# Patient Record
Sex: MALE | Race: WHITE | NOT HISPANIC OR LATINO | URBAN - METROPOLITAN AREA
[De-identification: names, ages, dates, MRNs, and addresses within clinical notes are randomized per-mention and may not be internally consistent; named-entity substitution may affect disease eponyms.]

---

## 2022-11-10 ENCOUNTER — OFFICE VISIT (OUTPATIENT)
Dept: FAMILY MEDICINE CLINIC | Facility: CLINIC | Age: 37
End: 2022-11-10

## 2022-11-10 VITALS
HEIGHT: 67 IN | HEART RATE: 96 BPM | WEIGHT: 144.2 LBS | DIASTOLIC BLOOD PRESSURE: 78 MMHG | BODY MASS INDEX: 22.63 KG/M2 | TEMPERATURE: 97.1 F | SYSTOLIC BLOOD PRESSURE: 120 MMHG | OXYGEN SATURATION: 98 %

## 2022-11-10 DIAGNOSIS — J30.2 SEASONAL ALLERGIES: ICD-10-CM

## 2022-11-10 DIAGNOSIS — Z86.59 HISTORY OF ATTENTION DEFICIT DISORDER: ICD-10-CM

## 2022-11-10 DIAGNOSIS — Z00.00 WELL ADULT EXAM: Primary | ICD-10-CM

## 2022-11-10 DIAGNOSIS — K60.2 ANAL FISSURE: ICD-10-CM

## 2022-11-10 DIAGNOSIS — Z13.1 DIABETES MELLITUS SCREENING: ICD-10-CM

## 2022-11-10 DIAGNOSIS — L98.9 SKIN LESIONS: ICD-10-CM

## 2022-11-10 DIAGNOSIS — Z78.9 UNCIRCUMCISED MALE: ICD-10-CM

## 2022-11-10 DIAGNOSIS — Z13.6 ENCOUNTER FOR LIPID SCREENING FOR CARDIOVASCULAR DISEASE: ICD-10-CM

## 2022-11-10 DIAGNOSIS — Z13.220 ENCOUNTER FOR LIPID SCREENING FOR CARDIOVASCULAR DISEASE: ICD-10-CM

## 2022-11-10 RX ORDER — ALBUTEROL SULFATE 90 UG/1
2 AEROSOL, METERED RESPIRATORY (INHALATION) EVERY 6 HOURS PRN
Qty: 6.7 G | Refills: 5 | Status: SHIPPED | OUTPATIENT
Start: 2022-11-10

## 2022-11-10 NOTE — PROGRESS NOTES
ADULT ANNUAL [address] [place] [address] MEDICAL GROUP    NAME: Genevie Favre  AGE: 42 y o  SEX: male  : 1985     DATE: 2022     Assessment and Plan:     Problem List Items Addressed This Visit    None  Visit Diagnoses     Well adult exam    -  Primary    Relevant Orders    Lipid Panel with Direct LDL reflex    Comprehensive metabolic panel    TSH, 3rd generation with Free T4 reflex    CBC and differential    Seasonal allergies        Relevant Medications    albuterol (Proventil HFA) 90 mcg/act inhaler    Encounter for lipid screening for cardiovascular disease        Relevant Orders    Lipid Panel with Direct LDL reflex    Diabetes mellitus screening        Relevant Orders    Comprehensive metabolic panel    Anal fissure        Relevant Orders    Ambulatory Referral to Colorectal Surgery    Skin lesions        Relevant Orders    Ambulatory Referral to Dermatology    History of attention deficit disorder        Relevant Orders    TSH, 3rd generation with Free T4 reflex    CBC and differential    Uncircumcised male        Relevant Orders    Ambulatory Referral to Urology          Immunizations and preventive care screenings were discussed with patient today  Appropriate education was printed on patient's after visit summary  Counseling:  Alcohol/drug use: discussed moderation in alcohol intake, the recommendations for healthy alcohol use, and avoidance of illicit drug use  Dental Health: discussed importance of regular tooth brushing, flossing, and dental visits  Exercise: the importance of regular exercise/physical activity was discussed  Recommend exercise 3-5 times per week for at least 30 minutes  Depression Screening and Follow-up Plan: Patient was screened for depression during today's encounter  They screened negative with a PHQ-2 score of 1  Referred to derm for multiple skin lesions  Referred to urology regarding circumcision    Referral to surgery regarding anal fissure  No follow-ups on file  Chief Complaint:     Chief Complaint   Patient presents with   • Establish Care     Refill on rescue inhaler  History of Present Illness:     Adult Annual Physical   Patient here for a comprehensive physical exam  The patient reports problems - problems with focus at new job   Works as a manager at [employer]  He had been on Ritalin as a kid  Previous doctor had prescribed Escitalopram and Effexor  Diet and Physical Activity  Diet/Nutrition: well balanced diet  Exercise: 5-7 times a week on average  Depression Screening  PHQ-2/9 Depression Screening    Little interest or pleasure in doing things: 0 - not at all  Feeling down, depressed, or hopeless: 1 - several days  PHQ-2 Score: 1  PHQ-2 Interpretation: Negative depression screen       General Health  Sleep: sleeps poorly  Hearing: normal - bilateral   Vision: wears glasses  Dental: regular dental visits   Health  History of STDs?: no      Review of Systems:     Review of Systems   Constitutional: Negative for chills and fever  HENT: Negative for congestion and sore throat  Respiratory: Negative for chest tightness  Cardiovascular: Negative for chest pain and palpitations  Gastrointestinal: Negative for abdominal pain, constipation, diarrhea and nausea  Genitourinary: Negative for difficulty urinating  Skin: Negative  Neurological: Negative for dizziness and headaches  Psychiatric/Behavioral: Negative  Past Medical History:     Past Medical History:   Diagnosis Date   • ADHD     Diagnosed at age 6   • Allergic       Past Surgical History:     History reviewed  No pertinent surgical history     Social History:     Social History     Socioeconomic History   • Marital status: Unknown     Spouse name: None   • Number of children: None   • Years of education: None   • Highest education level: None   Occupational History   • None   Tobacco Use   • Smoking status: Former     Packs/day: 1 00     Years: 5 00     Pack years: 5 00     Types: Cigarettes     Quit date: 2017     Years since quittin 0   • Smokeless tobacco: Never   Vaping Use   • Vaping Use: Every day   • Substances: Nicotine   Substance and Sexual Activity   • Alcohol use: Yes     Comment: social   • Drug use: Never   • Sexual activity: None   Other Topics Concern   • None   Social History Narrative   • None     Social Determinants of Health     Financial Resource Strain: Not on file   Food Insecurity: Not on file   Transportation Needs: Not on file   Physical Activity: Not on file   Stress: Not on file   Social Connections: Not on file   Intimate Partner Violence: Not on file   Housing Stability: Not on file      Family History:     Family History   Problem Relation Age of Onset   • Autoimmune disease Mother    • No Known Problems Sister       Current Medications:     Current Outpatient Medications   Medication Sig Dispense Refill   • albuterol (Proventil HFA) 90 mcg/act inhaler Inhale 2 puffs every 6 (six) hours as needed for wheezing 6 7 g 5     No current facility-administered medications for this visit  Allergies: Allergies   Allergen Reactions   • Pollen Extract Anaphylaxis     sneezing   • Amoxicillin Hives   • Shellfish-Derived Products - Food Allergy Hives      Physical Exam:     /78   Pulse 96   Temp (!) 97 1 °F (36 2 °C) (Tympanic)   Ht 5' 7" (1 702 m)   Wt 65 4 kg (144 lb 3 2 oz)   SpO2 98%   BMI 22 58 kg/m²     Physical Exam  Constitutional:       General: He is not in acute distress  Appearance: He is well-developed  HENT:      Right Ear: Tympanic membrane normal       Left Ear: Tympanic membrane normal       Mouth/Throat:      Pharynx: No oropharyngeal exudate  Neck:      Thyroid: No thyromegaly  Cardiovascular:      Rate and Rhythm: Normal rate and regular rhythm  Heart sounds: Normal heart sounds     Pulmonary:      Effort: Pulmonary effort is normal  Breath sounds: Normal breath sounds  Abdominal:      General: Bowel sounds are normal       Palpations: Abdomen is soft  Musculoskeletal:      Right lower leg: No edema  Left lower leg: No edema  Lymphadenopathy:      Cervical: No cervical adenopathy  Skin:     General: Skin is warm and dry  Neurological:      Mental Status: He is alert and oriented to person, place, and time     Psychiatric:         Mood and Affect: Mood normal           Nazia Snyder [facility] [address]

## 2022-11-10 NOTE — PATIENT INSTRUCTIONS
Evaluation for ADD   [facility] [phone]   [facility]  [phone]    [facility]   [phone]

## 2022-12-08 ENCOUNTER — OFFICE VISIT (OUTPATIENT)
Dept: UROLOGY | Facility: MEDICAL CENTER | Age: 37
End: 2022-12-08

## 2022-12-08 VITALS
HEIGHT: 67 IN | WEIGHT: 141 LBS | SYSTOLIC BLOOD PRESSURE: 140 MMHG | DIASTOLIC BLOOD PRESSURE: 74 MMHG | BODY MASS INDEX: 22.13 KG/M2 | HEART RATE: 101 BPM

## 2022-12-08 DIAGNOSIS — N47.1 PHIMOSIS: Primary | ICD-10-CM

## 2022-12-08 DIAGNOSIS — Z78.9 UNCIRCUMCISED MALE: ICD-10-CM

## 2022-12-08 NOTE — PROGRESS NOTES
HISTORY:    Patient has had he says 2-3 UTIs over the years, and feels his hygiene can be better with circumcision  Occasional discomfort with tightness of the foreskin, but no sexual issues  No voiding dysfunction          ASSESSMENT / PLAN:    We discussed circumcision, technique, potential discomfort afterwards and recovery  We will schedule    The following portions of the patient's history were reviewed and updated as appropriate: allergies, current medications, past family history, past medical history, past social history, past surgical history and problem list     Review of Systems   All other systems reviewed and are negative  Objective:     Physical Exam  Constitutional:       General: He is not in acute distress  Appearance: He is well-developed  He is not diaphoretic  HENT:      Head: Normocephalic and atraumatic  Eyes:      General: No scleral icterus  Pulmonary:      Effort: Pulmonary effort is normal    Genitourinary:     Comments: Penis with slightly tight foreskin    Testes normal  Skin:     Coloration: Skin is not pale  Neurological:      Mental Status: He is alert and oriented to person, place, and time  Psychiatric:         Behavior: Behavior normal          Thought Content: Thought content normal          Judgment: Judgment normal            No results found for: PSA]  No results found for: BUN  No results found for: CREATININE  No components found for: CBC      There is no problem list on file for this patient  Diagnoses and all orders for this visit:    Uncircumcised male  -     Ambulatory Referral to Urology           Patient ID: Humza Espinoza is a 42 y o  male        Current Outpatient Medications:   •  albuterol (Proventil HFA) 90 mcg/act inhaler, Inhale 2 puffs every 6 (six) hours as needed for wheezing, Disp: 6 7 g, Rfl: 5    Past Medical History:   Diagnosis Date   • ADHD     Diagnosed at age 6   • Allergic        No past surgical history on file     Social History

## 2022-12-15 ENCOUNTER — TELEPHONE (OUTPATIENT)
Dept: OTHER | Facility: OTHER | Age: 37
End: 2022-12-15

## 2022-12-15 NOTE — TELEPHONE ENCOUNTER
patient called back  pateint stated that date does not work for patient  Its 2 days before patients Birthday  Would like to have done in January if possible    Or alternative date after birthday  Please rigo patent back at [phone]

## 2022-12-15 NOTE — TELEPHONE ENCOUNTER
I I spoke to the patient and scheduled his Circ with Dr Ary Cockayne at [facility]      -instructions given verbally and Emailed  -CBC, CMP,Urine C&S   2 weeks prior  -patient will avoid any potentially blood thinning medications 7 days prior  -[facility] - on [address] [place] [address] tracker 12/15/2022

## 2022-12-15 NOTE — TELEPHONE ENCOUNTER
I returned the patients call and left a voice mail message suggesting 2/14/2023 at [facility] with Dr Connie Ross  I asked the patient to call back to confirm

## 2023-01-12 ENCOUNTER — CONSULT (OUTPATIENT)
Dept: DERMATOLOGY | Facility: CLINIC | Age: 38
End: 2023-01-12

## 2023-01-12 VITALS — TEMPERATURE: 97.7 F | HEIGHT: 68 IN | WEIGHT: 137 LBS | BODY MASS INDEX: 20.76 KG/M2

## 2023-01-12 DIAGNOSIS — Z12.83 SCREENING FOR MALIGNANT NEOPLASM OF SKIN: Primary | ICD-10-CM

## 2023-01-12 NOTE — PROGRESS NOTES
[facility]  Dermatology Clinic Note     Patient Name: Georgina Vargas  Encounter Date: 1/12/23     Have you been cared for by a [place] [address] Dermatologist in the last 3 years and, if so, which description applies to you? NO  I am considered a "new" patient and must complete all patient intake questions  I am MALE/not capable of bearing children  REVIEW OF SYSTEMS:  Have you recently had or currently have any of the following? · Recent fever or chills? No  · Any non-healing wound? No   PAST MEDICAL HISTORY:  Have you personally ever had or currently have any of the following? If "YES," then please provide more detail  · Skin cancer (such as Melanoma, Basal Cell Carcinoma, Squamous Cell Carcinoma? No  · Tuberculosis, HIV/AIDS, Hepatitis B or C: No  · Systemic Immunosuppression such as Diabetes, Biologic or Immunotherapy, Chemotherapy, Organ Transplantation, Bone Marrow Transplantation No  · Radiation Treatment No   FAMILY HISTORY:  Any "first degree relatives" (parent, brother, sister, or child) with the following? • Skin Cancer, Pancreatic or Other Cancer? No   PATIENT EXPERIENCE:    • Do you want the Dermatologist to perform a COMPLETE skin exam today including a clinical examination under the "bra and underwear" areas? Yes  • If necessary, do we have your permission to call and leave a detailed message on your Preferred Phone number that includes your specific medical information?   Yes      Allergies   Allergen Reactions   • Pollen Extract Anaphylaxis     sneezing   • Amoxicillin Hives   • Shellfish-Derived Products - Food Allergy Hives      Current Outpatient Medications:   •  albuterol (Proventil HFA) 90 mcg/act inhaler, Inhale 2 puffs every 6 (six) hours as needed for wheezing, Disp: 6 7 g, Rfl: 5          • Whom besides the patient is providing clinical information about today's encounter?   o NO ADDITIONAL HISTORIAN (patient alone provided history)    Physical Exam and Assessment/Plan by Diagnosis:      SCREENING SKIN EXAMINATION    Physical Exam:  • Anatomic Location Affected:   Mostly on sun-exposed areas of the   • Morphological Description:  Normal skin appearance  • Pertinent Positives:  • Pertinent Negatives: Additional History of Present Condition:     Assessment and Plan:  Based on a thorough discussion of this condition and the management approach to it (including a comprehensive discussion of the known risks, side effects and potential benefits of treatment), the patient (family) agrees to implement the following specific plan:  • Reassured benign   • Continue with yearly skin exams   • When outside we recommend using a wide brim hat, sunglasses, long sleeve and pants, sunscreen with SPF 80+ with reapplication every 2 hours, or SPF specific clothing  • Use a moisturizer + sunscreen "combo" product such as Neutrogena Daily Defense SPF 50+ or CeraVe AM at least three times a day  Melanocytic Nevi  Melanocytic nevi ("moles") are caused by collections of the color producing skin cells, or melanocytes, in 1 area in the skin  They can range in color from pink to dark brown and be either raised or flat  Some moles are present at birth (I e , "congenital nevi"), while others come up later in life (i e , "acquired nevi")  [place] exposure also stimulates the body to make more moles, ie the more sun you get the more moles you'll grow  Clinically distinguishing a healthy mole from melanoma may be difficult  The "ABCDE's" of moles have been suggested as a means of helping to alert a person to a suspicious mole and the possible increased risk of melanoma  Asymmetry: Healthy moles tend to be symmetric, while melanomas are often asymmetric    Asymmetry means if you draw a line through the mole, the two halves do not match in color, size, shape, or surface texture Any mole that starts to demonstrate "asymmetry" should be examined promptly by a board certified dermatologist      Aysha Hurtado: Healthy moles tend to have discrete, even borders  The border of a melanoma often blends into the normal skin and does not sharply delineate the mole from normal skin  Any mole that starts to demonstrate "uneven borders" should be examined promptly     Color: Healthy moles tend to be one color throughout  Melanomas tend to be made up of different colors ranging from dark black, blue, white, or red  Any mole that demonstrates a color change should be examined promptly    Diameter: Healthy moles tend to be smaller than 0 6 cm in size; an exception are "congenital nevi" that can be larger  Melanomas tend to grow and can often be greater than 0 6 cm (1/4 of an inch, or the size of a pencil eraser)  This is only a guideline, and many normal moles may be larger than 0 6 cm without being unhealthy  Any mole that starts to change in size (small to bigger or bigger to smaller) should be examined promptly    Evolving: Healthy moles tend to "stay the same "  Melanomas may often show signs of change or evolution such as a change in size, shape, color, or elevation  Any mole that starts to itch, bleed, crust, burn, hurt, or ulcerate or demonstrate a change or evolution should be examined promptly by a board certified dermatologist       What are atypical moles or dysplastic nevi? Dysplastic moles are moles that have some of the ABCDE  changes listed above but  are not cancerous  Sometimes a biopsy and microscopic examination are needed to determine the difference  They may indicate an increased risk of melanoma in that person, especially if there is a family history of melanoma  What is a Melanoma? The main concern when looking at a new or changing mole it to evaluate whether it may be a melanoma  The appearance of a "new mole" remains one of the most reliable methods for identifying a malignant melanoma  A melanoma is a type of skin cancer that can be deadly if it spreads throughout the body   The prognosis of a Melanoma depends on how deep it has penetrated in the skin  If caught early, they generally will not have had time to grow into the deeper layers of the skin and they cure rate is then very high  Once the melanoma grows deeper into the skin, the cure rate drops dramatically  Therefore, early detection and removal of a malignant melanoma results in a much better chance of complete cure            Scribe Attestation    I,:  Rozina Cade am acting as a scribe while in the presence of the attending physician :       I,:  Lucy Pepe MD personally performed the services described in this documentation    as scribed in my presence :

## 2023-01-12 NOTE — PATIENT INSTRUCTIONS
MELANOCYTIC NEVI ("Moles")      Assessment and Plan:  Based on a thorough discussion of this condition and the management approach to it (including a comprehensive discussion of the known risks, side effects and potential benefits of treatment), the patient (family) agrees to implement the following specific plan:  Reassured benign   Continue with yearly skin exams   When outside we recommend using a wide brim hat, sunglasses, long sleeve and pants, sunscreen with SPF 87+ with reapplication every 2 hours, or SPF specific clothing  Use a moisturizer + sunscreen "combo" product such as Neutrogena Daily Defense SPF 50+ or CeraVe AM at least three times a day  Melanocytic Nevi  Melanocytic nevi ("moles") are caused by collections of the color producing skin cells, or melanocytes, in 1 area in the skin  They can range in color from pink to dark brown and be either raised or flat  Some moles are present at birth (I e , "congenital nevi"), while others come up later in life (i e , "acquired nevi")  [place] exposure also stimulates the body to make more moles, ie the more sun you get the more moles you'll grow  Clinically distinguishing a healthy mole from melanoma may be difficult  The "ABCDE's" of moles have been suggested as a means of helping to alert a person to a suspicious mole and the possible increased risk of melanoma  Asymmetry: Healthy moles tend to be symmetric, while melanomas are often asymmetric  Asymmetry means if you draw a line through the mole, the two halves do not match in color, size, shape, or surface texture Any mole that starts to demonstrate "asymmetry" should be examined promptly by a board certified dermatologist      Border: Healthy moles tend to have discrete, even borders  The border of a melanoma often blends into the normal skin and does not sharply delineate the mole from normal skin    Any mole that starts to demonstrate "uneven borders" should be examined promptly Color: Healthy moles tend to be one color throughout  Melanomas tend to be made up of different colors ranging from dark black, blue, white, or red  Any mole that demonstrates a color change should be examined promptly    Diameter: Healthy moles tend to be smaller than 0 6 cm in size; an exception are "congenital nevi" that can be larger  Melanomas tend to grow and can often be greater than 0 6 cm (1/4 of an inch, or the size of a pencil eraser)  This is only a guideline, and many normal moles may be larger than 0 6 cm without being unhealthy  Any mole that starts to change in size (small to bigger or bigger to smaller) should be examined promptly    Evolving: Healthy moles tend to "stay the same "  Melanomas may often show signs of change or evolution such as a change in size, shape, color, or elevation  Any mole that starts to itch, bleed, crust, burn, hurt, or ulcerate or demonstrate a change or evolution should be examined promptly by a board certified dermatologist       What are atypical moles or dysplastic nevi? Dysplastic moles are moles that have some of the ABCDE  changes listed above but  are not cancerous  Sometimes a biopsy and microscopic examination are needed to determine the difference  They may indicate an increased risk of melanoma in that person, especially if there is a family history of melanoma  What is a Melanoma? The main concern when looking at a new or changing mole it to evaluate whether it may be a melanoma  The appearance of a "new mole" remains one of the most reliable methods for identifying a malignant melanoma  A melanoma is a type of skin cancer that can be deadly if it spreads throughout the body  The prognosis of a Melanoma depends on how deep it has penetrated in the skin  If caught early, they generally will not have had time to grow into the deeper layers of the skin and they cure rate is then very high   Once the melanoma grows deeper into the skin, the cure rate drops dramatically  Therefore, early detection and removal of a malignant melanoma results in a much better chance of complete cure

## 2023-01-30 ENCOUNTER — TELEPHONE (OUTPATIENT)
Dept: UROLOGY | Facility: MEDICAL CENTER | Age: 38
End: 2023-01-30

## 2023-02-09 ENCOUNTER — APPOINTMENT (OUTPATIENT)
Dept: LAB | Facility: CLINIC | Age: 38
End: 2023-02-09

## 2023-02-09 DIAGNOSIS — Z13.6 ENCOUNTER FOR LIPID SCREENING FOR CARDIOVASCULAR DISEASE: ICD-10-CM

## 2023-02-09 DIAGNOSIS — N47.1 PHIMOSIS: ICD-10-CM

## 2023-02-09 DIAGNOSIS — Z78.9 UNCIRCUMCISED MALE: ICD-10-CM

## 2023-02-09 DIAGNOSIS — Z00.00 WELL ADULT EXAM: ICD-10-CM

## 2023-02-09 DIAGNOSIS — Z13.220 ENCOUNTER FOR LIPID SCREENING FOR CARDIOVASCULAR DISEASE: ICD-10-CM

## 2023-02-09 DIAGNOSIS — Z13.1 DIABETES MELLITUS SCREENING: ICD-10-CM

## 2023-02-09 DIAGNOSIS — Z86.59 HISTORY OF ATTENTION DEFICIT DISORDER: ICD-10-CM

## 2023-02-09 LAB
ALBUMIN SERPL BCP-MCNC: 3.8 G/DL (ref 3.5–5)
ALP SERPL-CCNC: 66 U/L (ref 46–116)
ALT SERPL W P-5'-P-CCNC: 47 U/L (ref 12–78)
ANION GAP SERPL CALCULATED.3IONS-SCNC: 3 MMOL/L (ref 4–13)
AST SERPL W P-5'-P-CCNC: 22 U/L (ref 5–45)
BASOPHILS # BLD AUTO: 0.04 THOUSANDS/ÂΜL (ref 0–0.1)
BASOPHILS NFR BLD AUTO: 1 % (ref 0–1)
BILIRUB SERPL-MCNC: 0.39 MG/DL (ref 0.2–1)
BUN SERPL-MCNC: 21 MG/DL (ref 5–25)
CALCIUM SERPL-MCNC: 8.9 MG/DL (ref 8.3–10.1)
CHLORIDE SERPL-SCNC: 107 MMOL/L (ref 96–108)
CHOLEST SERPL-MCNC: 195 MG/DL
CO2 SERPL-SCNC: 27 MMOL/L (ref 21–32)
CREAT SERPL-MCNC: 1.03 MG/DL (ref 0.6–1.3)
EOSINOPHIL # BLD AUTO: 0.32 THOUSAND/ÂΜL (ref 0–0.61)
EOSINOPHIL NFR BLD AUTO: 4 % (ref 0–6)
ERYTHROCYTE [DISTWIDTH] IN BLOOD BY AUTOMATED COUNT: 13 % (ref 11.6–15.1)
GFR SERPL CREATININE-BSD FRML MDRD: 91 ML/MIN/1.73SQ M
GLUCOSE SERPL-MCNC: 89 MG/DL (ref 65–140)
HCT VFR BLD AUTO: 44.4 % (ref 36.5–49.3)
HDLC SERPL-MCNC: 54 MG/DL
HGB BLD-MCNC: 14.9 G/DL (ref 12–17)
IMM GRANULOCYTES # BLD AUTO: 0.03 THOUSAND/UL (ref 0–0.2)
IMM GRANULOCYTES NFR BLD AUTO: 0 % (ref 0–2)
LDLC SERPL CALC-MCNC: 119 MG/DL (ref 0–100)
LYMPHOCYTES # BLD AUTO: 3.09 THOUSANDS/ÂΜL (ref 0.6–4.47)
LYMPHOCYTES NFR BLD AUTO: 41 % (ref 14–44)
MCH RBC QN AUTO: 30 PG (ref 26.8–34.3)
MCHC RBC AUTO-ENTMCNC: 33.6 G/DL (ref 31.4–37.4)
MCV RBC AUTO: 90 FL (ref 82–98)
MONOCYTES # BLD AUTO: 0.67 THOUSAND/ÂΜL (ref 0.17–1.22)
MONOCYTES NFR BLD AUTO: 9 % (ref 4–12)
NEUTROPHILS # BLD AUTO: 3.41 THOUSANDS/ÂΜL (ref 1.85–7.62)
NEUTS SEG NFR BLD AUTO: 45 % (ref 43–75)
NRBC BLD AUTO-RTO: 0 /100 WBCS
PLATELET # BLD AUTO: 230 THOUSANDS/UL (ref 149–390)
PMV BLD AUTO: 10.4 FL (ref 8.9–12.7)
POTASSIUM SERPL-SCNC: 3.9 MMOL/L (ref 3.5–5.3)
PROT SERPL-MCNC: 6.9 G/DL (ref 6.4–8.4)
RBC # BLD AUTO: 4.96 MILLION/UL (ref 3.88–5.62)
SODIUM SERPL-SCNC: 137 MMOL/L (ref 135–147)
TRIGL SERPL-MCNC: 112 MG/DL
TSH SERPL DL<=0.05 MIU/L-ACNC: 4.07 UIU/ML (ref 0.45–4.5)
WBC # BLD AUTO: 7.56 THOUSAND/UL (ref 4.31–10.16)

## 2023-02-10 LAB — BACTERIA UR CULT: NORMAL

## 2023-02-16 RX ORDER — ASCORBIC ACID 1000 MG
TABLET ORAL
COMMUNITY

## 2023-02-16 RX ORDER — MELATONIN 10 MG
10 CAPSULE ORAL
COMMUNITY

## 2023-02-16 RX ORDER — DIPHENOXYLATE HYDROCHLORIDE AND ATROPINE SULFATE 2.5; .025 MG/1; MG/1
1 TABLET ORAL DAILY
COMMUNITY

## 2023-02-16 NOTE — PRE-PROCEDURE INSTRUCTIONS
Pre-Surgery Instructions:   Medication Instructions   • albuterol (Proventil HFA) 90 mcg/act inhaler Uses PRN- OK to take day of surgery   • Biotin w/ Vitamins C & E (HAIR/SKIN/NAILS PO) Stop taking 5 days prior to surgery  • Ginkgo Biloba 40 MG TABS Stop taking 5 days prior to surgery  • GINSENG [place] PO Stop taking 5 days prior to surgery  • Thanh Root (THANH PO) Stop taking 5 days prior to surgery  • Melatonin 10 MG CAPS Stop taking 5 days prior to surgery  • multivitamin (THERAGRAN) TABS Stop taking 5 days prior to surgery  • NON FORMULARY Stop taking 5 days prior to surgery  • Specialty Vitamins Products (ADVANCED COLLAGEN PO) Stop taking 5 days prior to surgery  NPO after midnight, meds in am with sips of water  Understands when to expect preop phone call  Remove all jewelry  Advised of visitation policy  Before your operation, you play an important role in decreasing your risk for infection by washing with special antiseptic soap  This is an effective way to reduce bacteria on the skin which may help to prevent infections at the surgical site  Please read the following directions in advance    Use antibacterial soap    2  The day before your operation follow these directions carefully to get ready  · Place clean lines (sheets) on your bed; you should sleep on clean sheets after your evening shower  · Get clean towels and washcloths ready - you need enough for 2 showers  · Set aside clean underwear, pajamas, and clothing to wear after the shower  Reminders:  · DO NOT use any other soap or body rinse on your skin during or after the antiseptic showers  · DO NOT use lotion , powder, deodorant, or perfume/aftershave of any kind on your skin after your antiseptic shower  · DO NOT shave any body parts in the 24 hours/the day before your operation  · DO NOT get the antiseptic soap in your eyes, ears, nose, mouth, or vaginal area      3      You will need to shower the night before AND the morning of your Surgery  Shower 1:  · The evening before your operation, take the fist shower  · First, shampoo your hair with regular shampoo and rinse it completely before you use the anitseptic soap  After washing and rinsing your hair, rinse your body  · Next, use a clean wash cloth to apply the antiseptic soap and wash your body from the neck down to your toes using 1/2 bottle of the antiseptic soap  You will use the other 1/2 bottle for the second shower  · Clean the area where your incision will be; later this area well for about 2 minutes  · If you ar having head or neck surgery, wash areas with the antiseptic soap  · Rinse yourself completely with running water  · Use a clean towel to dry off  · Wear clean underwear and clothing/pajamas  Shower 2:  · The Morning of your operation, take the second shower following the same steps as Shower 1 using the second 1/2 of the bottle of antiseptic soap  · Use clean cloths and towels to was and dry yourself off  · Wear clean underwear and clothing

## 2023-02-19 ENCOUNTER — ANESTHESIA EVENT (OUTPATIENT)
Dept: PERIOP | Facility: HOSPITAL | Age: 38
End: 2023-02-19

## 2023-02-20 NOTE — H&P
HISTORY AND PHYSICAL  ? ? Patient Name: Georgina Vargas  Patient MRN: [id number]  Attending Provider: Shae Harris MD  Service: Urology  Chief Complaint    Tight foreskin    HPI   Georgina Vargas is a 34 y o  male with above history, several UTIs  I plan circumcision  Potential risks and complications discussed, and informed consent was given by the patient  Medications  Meds/Allergies   No current facility-administered medications for this encounter  Cannot display prior to admission medications because the patient has not been admitted in this contact  No current facility-administered medications for this encounter      Current Outpatient Medications:   •  albuterol (Proventil HFA) 90 mcg/act inhaler, Inhale 2 puffs every 6 (six) hours as needed for wheezing, Disp: 6 7 g, Rfl: 5  •  Biotin w/ Vitamins C & E (HAIR/SKIN/NAILS PO), Take by mouth, Disp: , Rfl:   •  Ginkgo Biloba 40 MG TABS, Take by mouth, Disp: , Rfl:   •  GINSENG Kazakh PO, Take by mouth in the morning, Disp: , Rfl:   •  Thanh Root (THANH PO), Take by mouth in the morning, Disp: , Rfl:   •  Melatonin 10 MG CAPS, Take 10 mg by mouth daily at bedtime as needed, Disp: , Rfl:   •  multivitamin (THERAGRAN) TABS, Take 1 tablet by mouth daily, Disp: , Rfl:   •  NON FORMULARY, in the morning Green Tea concentrate, Disp: , Rfl:   •  Specialty Vitamins Products (ADVANCED COLLAGEN PO), Take by mouth in the morning, Disp: , Rfl:   Review of Systems  10 point review of systems negative except as noted in HPI  Allergies  Allergies   Allergen Reactions   • Pollen Extract Anaphylaxis     High exposure causes throat to close   • Amoxicillin Hives   • Shellfish-Derived Products - Food Allergy Hives     PMH  Past Medical History:   Diagnosis Date   • ADHD     Diagnosed at age 6   • Allergic      Past surgical history  Past Surgical History:   Procedure Laterality Date   • DENTAL SURGERY      under local   • NO PAST SURGERIES       Social history  Social History Tobacco Use   • Smoking status: Former     Packs/day: 1 00     Years: 5 00     Pack years: 5 00     Types: Cigarettes     Quit date: 2017     Years since quittin 1   • Smokeless tobacco: Never   Vaping Use   • Vaping Use: Every day   • Substances: Nicotine   Substance Use Topics   • Alcohol use: Yes     Comment: 1 x month   • Drug use: Never     ?   Physical Exam     vs  General appearance: alert and oriented, in no acute distress  Head: Normocephalic, without obvious abnormality, atraumatic  Throat: lips, mucosa, and tongue normal; teeth and gums normal  Neck: no adenopathy, no carotid bruit, no JVD, supple, symmetrical, trachea midline and thyroid not enlarged, symmetric, no tenderness/mass/nodules  Lungs: clear to auscultation bilaterally  Heart: regular rate and rhythm, S1, S2 normal, no murmur, click, rub or gallop  Male genitalia: normal  Extremities: extremities normal, warm and well-perfused; no cyanosis, clubbing, or edema  Rissa Platt MD

## 2023-02-21 ENCOUNTER — TELEPHONE (OUTPATIENT)
Dept: UROLOGY | Facility: AMBULATORY SURGERY CENTER | Age: 38
End: 2023-02-21

## 2023-02-21 ENCOUNTER — ANESTHESIA (OUTPATIENT)
Dept: PERIOP | Facility: HOSPITAL | Age: 38
End: 2023-02-21

## 2023-02-21 ENCOUNTER — HOSPITAL ENCOUNTER (OUTPATIENT)
Facility: HOSPITAL | Age: 38
Setting detail: OUTPATIENT SURGERY
Discharge: HOME | End: 2023-02-21
Attending: UROLOGY | Admitting: UROLOGY

## 2023-02-21 VITALS
SYSTOLIC BLOOD PRESSURE: 114 MMHG | HEART RATE: 78 BPM | RESPIRATION RATE: 16 BRPM | WEIGHT: 147.93 LBS | OXYGEN SATURATION: 95 % | HEIGHT: 68 IN | DIASTOLIC BLOOD PRESSURE: 82 MMHG | BODY MASS INDEX: 22.42 KG/M2 | TEMPERATURE: 97.9 F

## 2023-02-21 DIAGNOSIS — N47.1 PHIMOSIS: Primary | ICD-10-CM

## 2023-02-21 RX ORDER — LORAZEPAM 0.5 MG/1
1 TABLET ORAL ONCE AS NEEDED
Status: COMPLETED | OUTPATIENT
Start: 2023-02-21 | End: 2023-02-21

## 2023-02-21 RX ORDER — OXYCODONE HYDROCHLORIDE AND ACETAMINOPHEN 5; 325 MG/1; MG/1
1 TABLET ORAL EVERY 4 HOURS PRN
Status: DISCONTINUED | OUTPATIENT
Start: 2023-02-21 | End: 2023-02-21 | Stop reason: HOSPADM

## 2023-02-21 RX ORDER — SODIUM CHLORIDE, SODIUM LACTATE, POTASSIUM CHLORIDE, CALCIUM CHLORIDE 600; 310; 30; 20 MG/100ML; MG/100ML; MG/100ML; MG/100ML
125 INJECTION, SOLUTION INTRAVENOUS CONTINUOUS
Status: DISCONTINUED | OUTPATIENT
Start: 2023-02-21 | End: 2023-02-21 | Stop reason: HOSPADM

## 2023-02-21 RX ORDER — CEFAZOLIN SODIUM 1 G/50ML
1000 SOLUTION INTRAVENOUS ONCE
Status: COMPLETED | OUTPATIENT
Start: 2023-02-21 | End: 2023-02-21

## 2023-02-21 RX ORDER — HYDROCODONE BITARTRATE AND ACETAMINOPHEN 5; 325 MG/1; MG/1
1-2 TABLET ORAL EVERY 6 HOURS PRN
Qty: 20 TABLET | Refills: 0 | Status: SHIPPED | OUTPATIENT
Start: 2023-02-21 | End: 2023-03-03

## 2023-02-21 RX ORDER — FENTANYL CITRATE 50 UG/ML
INJECTION, SOLUTION INTRAMUSCULAR; INTRAVENOUS AS NEEDED
Status: DISCONTINUED | OUTPATIENT
Start: 2023-02-21 | End: 2023-02-21

## 2023-02-21 RX ORDER — DEXAMETHASONE SODIUM PHOSPHATE 4 MG/ML
INJECTION, SOLUTION INTRA-ARTICULAR; INTRALESIONAL; INTRAMUSCULAR; INTRAVENOUS; SOFT TISSUE AS NEEDED
Status: DISCONTINUED | OUTPATIENT
Start: 2023-02-21 | End: 2023-02-21

## 2023-02-21 RX ORDER — FENTANYL CITRATE/PF 50 MCG/ML
25 SYRINGE (ML) INJECTION
Status: DISCONTINUED | OUTPATIENT
Start: 2023-02-21 | End: 2023-02-21 | Stop reason: HOSPADM

## 2023-02-21 RX ORDER — ONDANSETRON 2 MG/ML
4 INJECTION INTRAMUSCULAR; INTRAVENOUS ONCE AS NEEDED
Status: DISCONTINUED | OUTPATIENT
Start: 2023-02-21 | End: 2023-02-21 | Stop reason: HOSPADM

## 2023-02-21 RX ORDER — BUPIVACAINE HYDROCHLORIDE 5 MG/ML
INJECTION, SOLUTION PERINEURAL AS NEEDED
Status: DISCONTINUED | OUTPATIENT
Start: 2023-02-21 | End: 2023-02-21 | Stop reason: HOSPADM

## 2023-02-21 RX ORDER — HYDROMORPHONE HCL/PF 1 MG/ML
0.5 SYRINGE (ML) INJECTION
Status: DISCONTINUED | OUTPATIENT
Start: 2023-02-21 | End: 2023-02-21 | Stop reason: HOSPADM

## 2023-02-21 RX ORDER — ONDANSETRON 2 MG/ML
INJECTION INTRAMUSCULAR; INTRAVENOUS AS NEEDED
Status: DISCONTINUED | OUTPATIENT
Start: 2023-02-21 | End: 2023-02-21

## 2023-02-21 RX ORDER — MEPERIDINE HYDROCHLORIDE 25 MG/ML
12.5 INJECTION INTRAMUSCULAR; INTRAVENOUS; SUBCUTANEOUS
Status: DISCONTINUED | OUTPATIENT
Start: 2023-02-21 | End: 2023-02-21 | Stop reason: HOSPADM

## 2023-02-21 RX ORDER — PROPOFOL 10 MG/ML
INJECTION, EMULSION INTRAVENOUS AS NEEDED
Status: DISCONTINUED | OUTPATIENT
Start: 2023-02-21 | End: 2023-02-21

## 2023-02-21 RX ORDER — GINSENG 100 MG
CAPSULE ORAL AS NEEDED
Status: DISCONTINUED | OUTPATIENT
Start: 2023-02-21 | End: 2023-02-21 | Stop reason: HOSPADM

## 2023-02-21 RX ORDER — MIDAZOLAM HYDROCHLORIDE 2 MG/2ML
INJECTION, SOLUTION INTRAMUSCULAR; INTRAVENOUS AS NEEDED
Status: DISCONTINUED | OUTPATIENT
Start: 2023-02-21 | End: 2023-02-21

## 2023-02-21 RX ADMIN — SODIUM CHLORIDE, SODIUM LACTATE, POTASSIUM CHLORIDE, AND CALCIUM CHLORIDE 125 ML/HR: .6; .31; .03; .02 INJECTION, SOLUTION INTRAVENOUS at 07:45

## 2023-02-21 RX ADMIN — SODIUM CHLORIDE, SODIUM LACTATE, POTASSIUM CHLORIDE, AND CALCIUM CHLORIDE: .6; .31; .03; .02 INJECTION, SOLUTION INTRAVENOUS at 08:43

## 2023-02-21 RX ADMIN — LIDOCAINE HYDROCHLORIDE 100 MG: 20 INJECTION INTRAVENOUS at 08:26

## 2023-02-21 RX ADMIN — PROPOFOL 200 MG: 10 INJECTION, EMULSION INTRAVENOUS at 08:26

## 2023-02-21 RX ADMIN — DEXAMETHASONE SODIUM PHOSPHATE 10 MG: 4 INJECTION INTRA-ARTICULAR; INTRALESIONAL; INTRAMUSCULAR; INTRAVENOUS; SOFT TISSUE at 08:26

## 2023-02-21 RX ADMIN — FENTANYL CITRATE 50 MCG: 50 INJECTION INTRAMUSCULAR; INTRAVENOUS at 08:28

## 2023-02-21 RX ADMIN — MIDAZOLAM 4 MG: 1 INJECTION INTRAMUSCULAR; INTRAVENOUS at 08:24

## 2023-02-21 RX ADMIN — FENTANYL CITRATE 50 MCG: 50 INJECTION INTRAMUSCULAR; INTRAVENOUS at 09:17

## 2023-02-21 RX ADMIN — ONDANSETRON 4 MG: 2 INJECTION INTRAMUSCULAR; INTRAVENOUS at 08:26

## 2023-02-21 RX ADMIN — LORAZEPAM 1 MG: 0.5 TABLET ORAL at 07:29

## 2023-02-21 RX ADMIN — CEFAZOLIN SODIUM 1000 MG: 1 SOLUTION INTRAVENOUS at 08:19

## 2023-02-21 NOTE — ANESTHESIA POSTPROCEDURE EVALUATION
Post-Op Assessment Note    CV Status:  Stable    Pain management: adequate     Mental Status:  Alert and awake   Hydration Status:  Euvolemic   PONV Controlled:  Controlled   Airway Patency:  Patent      Post Op Vitals Reviewed: Yes      Staff: Anesthesiologist, CRNA         No notable events documented      BP      Temp      Pulse     Resp      SpO2      /82   Pulse 78   Temp 97 9 °F (36 6 °C) (Temporal)   Resp 16   Ht 5' 7 5" (1 715 m)   Wt 67 1 kg (147 lb 14 9 oz)   SpO2 95%   BMI 22 83 kg/m²

## 2023-02-21 NOTE — DISCHARGE INSTR - AVS FIRST PAGE
ALL YOUR  PREVIOUS MEDS ARE THE SAME  NEW MEDS: Hydrocodone if needed for pain  Remove the bandage and tape on Thursday morning  Sponge bathe still then  You can shower at that time  You will see some blood spotting on the stitches, some bruising on the penis,    The stitches will dissolve in about 4 weeks or so, do not pick at them    ACTIVITY:  RESUME FULL ACTIVITY 2 to 3 days, be careful to not let the penis get bumped too much

## 2023-02-21 NOTE — ANESTHESIA PREPROCEDURE EVALUATION
Procedure:  CIRCUMCISION ADULT (Penis)    Relevant Problems   Other   (+) ADHD        Physical Exam    Airway    Mallampati score: II  TM Distance: >3 FB       Dental       Cardiovascular  Cardiovascular exam normal    Pulmonary  Pulmonary exam normal     Other Findings        Anesthesia Plan  ASA Score- 1     Anesthesia Type- general with ASA Monitors  Additional Monitors:   Airway Plan: LMA  Comment: Extremely anxious, ativan PO ordered  Patient is a red head  Plan Factors-Exercise tolerance (METS): >4 METS  Chart reviewed  Patient is not a current smoker  Obstructive sleep apnea risk education given perioperatively  Induction- intravenous  Postoperative Plan- Plan for postoperative opioid use  Informed Consent- Anesthetic plan and risks discussed with patient

## 2023-02-21 NOTE — INTERVAL H&P NOTE
H&P reviewed  After examining the patient I find no changes in the patients condition since the H&P had been written      Vitals:    02/21/23 0739   BP: 133/88   Pulse:    Resp:    Temp:    SpO2:

## 2023-02-21 NOTE — OP NOTE
OPERATIVE REPORT  PATIENT NAME: Mellisa Peralta    :  1985  MRN: [id number]  Pt Location: [facility] OR ROOM 06    SURGERY DATE: 2023    Surgeon(s) and Role:     * Juan Luis Hassan MD - Primary    Preop Diagnosis:  Uncircumcised male [Z78 9]  Phimosis [N47 1]    Post-Op Diagnosis Codes:     * Uncircumcised male [Z78 9]     * Phimosis [N47 1]    Procedure(s):  CIRCUMCISION ADULT    Specimen(s):  * No specimens in log *    Estimated Blood Loss:   Minimal    Drains:  * No LDAs found *    Anesthesia Type:   General/LMA    Operative Indications:  Uncircumcised male [Z78 9]  Phimosis [N47 1]      Operative Findings:  Tight foreskin    Complications:   None    Procedure and Technique:    After the patient was placed under adequate general anesthesia, he was prepped and draped using chlorhexidine in supine position  0 5% Marcaine was used as a penile block, 10 mL  A dorsal incision was made at the 12 o'clock position on the foreskin down to just proximal to the corona  Similarly, a ventral incision was made down to the frenulum  Using scissors, the two lateral wings of foreskin were excised, taking care not to cut too much skin  Cautery was used for hemostasis  The two free cut edges of foreskin were reapproximated with a combination of simple and running 4-0 Vicryl suture  At the completion of the procedure, there was no significant bleeding  A sterile dressing applied, patient taken recovery in good condition     I was present for the entire procedure    Patient Disposition:  PACU  and extubated and stable        SIGNATURE: Juan Luis Hassan MD  DATE: 2023  TIME: 9:18 AM 97

## 2023-02-21 NOTE — TELEPHONE ENCOUNTER
Post Op Note    Maribell Concepcion is a 34 y o  male s/p circumcision performed 02/21/2023  Maribell Concepcion is a patient of Dr Connie Ross and is seen at the [facility]

## 2023-02-21 NOTE — QUICK NOTE
Patient has requested for his own personal [employer] reasons, that he be allowed to take the foreskin specimen home with him at the end of the case, so that the his body tissue can be buried in concentrated ground  Pathology review of the specimen is not needed, so management and I have agreed to patient request   He will be given the tissue to take home with him today

## 2023-02-21 NOTE — DISCHARGE SUMMARY
Discharge Summary - Kala Lord 34 y o  male MRN: [id number]    Admission Date: 2/21/2023     Admitting Diagnosis: Uncircumcised male [Z78 9]  Phimosis [N47 1]    Procedures Performed: Circumcision    Patient underwent planned outpt surgery and recovered without complication  Discharged in good condition  Medications were prescribed  Pt knows to have office follow-up at the appropriate time

## 2023-02-22 NOTE — TELEPHONE ENCOUNTER
Post Op Note    Andrez Lackey is a 34 y o  male s/p Circumcision performed 02/21/2023  Andrez Lackey is a patient of Dr Rosmery Kim and is seen at the [facility] office  How would you rate your pain on a scale from 1 to 10, 10 being the worst pain ever? 4  Have you had a fever? No  Have your bowel movements been regular? No  Do you have any difficulty urinating? No just pain  If the patient has an incision- do you have any redness around the incision or any drainage from the incision? No  Do you have any other questions or concerns that I can address at this time?  Bandages off in 3 days can take ibuprofen and tylenol alternating with pain medicaitn - he understands

## 2023-02-27 ENCOUNTER — TELEPHONE (OUTPATIENT)
Dept: UROLOGY | Facility: MEDICAL CENTER | Age: 38
End: 2023-02-27

## 2023-02-27 ENCOUNTER — TELEPHONE (OUTPATIENT)
Dept: OTHER | Facility: OTHER | Age: 38
End: 2023-02-27

## 2023-02-27 DIAGNOSIS — N47.1 PHIMOSIS: ICD-10-CM

## 2023-02-27 RX ORDER — HYDROCODONE BITARTRATE AND ACETAMINOPHEN 5; 325 MG/1; MG/1
1-2 TABLET ORAL EVERY 6 HOURS PRN
Qty: 20 TABLET | Refills: 0 | OUTPATIENT
Start: 2023-02-27 | End: 2023-03-09

## 2023-02-27 NOTE — TELEPHONE ENCOUNTER
Medication Refill Request     Name HYDROcodone-acetaminophen (NORCO) 5-325 mg per tablet  Dose/Frequency Take 1-2 tablets by mouth every 6 (six)   Quantity 20 tablet   Verified pharmacy   [x]  Verified ordering Provider   [x]  Does patient have enough for the next 3 days?  Yes [x] No []

## 2023-02-27 NOTE — TELEPHONE ENCOUNTER
Louise Shine     KM     4:44 PM  Note      Patient called and is asking if the office can give him a call regarding why his medication hydrocodone-acetaminophen was denied, patient stated that he just surgery a week ago and he is having a lot of pain

## 2023-02-27 NOTE — TELEPHONE ENCOUNTER
Returned call to patient s/p 2/21/23 adult circumcision   Patient took the last dose of pain medication yesterday  Presley Johnson yesterday   Ran out so he was taking tylenol and motrin instead  Reports increased pain in Am due to erection in am  1000 mg of motrin 3-4 hours   Patient has been taking 2-3 pm tylenol 1000 mg tylenol today  Not helping with pain  circumcision incision is clear to yellow   No odor reported  TT provider on call for recommendation   Response pending  Patient added to provider schedule to tomorrow Am per on call provider for melanieal    Advised patient on correct use of ibuprofen and tylenol per pack recommendations  Advised patient to ware loose fitting underwater and clothing  Keep are clean

## 2023-02-27 NOTE — TELEPHONE ENCOUNTER
Patient called and is asking if the office can give him a call regarding why his medication hydrocodone-acetaminophen was denied, patient stated that he just surgery a week ago and he is having a lot of pain

## 2023-02-28 ENCOUNTER — TELEPHONE (OUTPATIENT)
Dept: UROLOGY | Facility: MEDICAL CENTER | Age: 38
End: 2023-02-28

## 2023-02-28 ENCOUNTER — OFFICE VISIT (OUTPATIENT)
Dept: UROLOGY | Facility: MEDICAL CENTER | Age: 38
End: 2023-02-28

## 2023-02-28 VITALS
HEART RATE: 109 BPM | BODY MASS INDEX: 22.28 KG/M2 | SYSTOLIC BLOOD PRESSURE: 130 MMHG | OXYGEN SATURATION: 96 % | WEIGHT: 147 LBS | HEIGHT: 68 IN | DIASTOLIC BLOOD PRESSURE: 80 MMHG

## 2023-02-28 DIAGNOSIS — N47.1 PHIMOSIS: Primary | ICD-10-CM

## 2023-02-28 NOTE — PROGRESS NOTES
Circumcision 1 week ago  Patient concerned about small firm area right mid shaft and one further distal on the left  He says he gets some shooting pains in and out again which makes him stop from do anything physical     He keeps it wrapped with a loose gauze at nighttime to prevent erection which he feels is painful  Exam shows normal healing process  Some ecchymosis will go down  Small firm spot right midshaft will go down as well    Encouraged to give it time, he will keep the appointment next week      I told him it is not usually necessary to wrap it at nighttime as he is doing, he says that makes it feel better

## 2023-03-08 ENCOUNTER — OFFICE VISIT (OUTPATIENT)
Dept: UROLOGY | Facility: MEDICAL CENTER | Age: 38
End: 2023-03-08
Payer: COMMERCIAL

## 2023-03-08 VITALS — WEIGHT: 147 LBS | BODY MASS INDEX: 22.28 KG/M2 | HEIGHT: 68 IN

## 2023-03-08 DIAGNOSIS — N48.89 PENILE PAIN: ICD-10-CM

## 2023-03-08 DIAGNOSIS — R30.0 DYSURIA: ICD-10-CM

## 2023-03-08 DIAGNOSIS — G89.18 POSTOPERATIVE PAIN: Primary | ICD-10-CM

## 2023-03-08 LAB
SL AMB  POCT GLUCOSE, UA: NORMAL
SL AMB LEUKOCYTE ESTERASE,UA: NORMAL
SL AMB POCT BILIRUBIN,UA: NORMAL
SL AMB POCT BLOOD,UA: NORMAL
SL AMB POCT CLARITY,UA: CLEAR
SL AMB POCT COLOR,UA: YELLOW
SL AMB POCT KETONES,UA: NORMAL
SL AMB POCT NITRITE,UA: NORMAL
SL AMB POCT PH,UA: 6
SL AMB POCT SPECIFIC GRAVITY,UA: 1.02
SL AMB POCT URINE PROTEIN: NORMAL

## 2023-03-08 PROCEDURE — 99213 OFFICE O/P EST LOW 20 MIN: CPT | Performed by: PHYSICIAN ASSISTANT

## 2023-03-08 PROCEDURE — 81003 URINALYSIS AUTO W/O SCOPE: CPT | Performed by: PHYSICIAN ASSISTANT

## 2023-03-08 RX ORDER — TRAMADOL HYDROCHLORIDE 50 MG/1
50 TABLET ORAL EVERY 6 HOURS PRN
Qty: 16 TABLET | Refills: 0 | Status: SHIPPED | OUTPATIENT
Start: 2023-03-08

## 2023-03-08 NOTE — PROGRESS NOTES
"3/8/2023      Chief Complaint   Patient presents with   • Penis Pain         Assessment and Plan    34 y o  male managed by Dr Harry Lucas     1  Penile pain  · S/p circumcision on 2/21  · No signs of infection on exam today  · Given persistent, significant pain, short course script of Tramadol sent to pharmacy  · F/u prn       History of Present Illness  Felicia Ellis is a 34 y o  male here for postop wound check  Patient is s/p circumcision on 2/21  Patient notes swelling and redness at his incision site  He has tried to control his pain with Norco, Tylenol, and ibuprofen  He states that the pain is significant, especially when he has an erection  Describes the pain as a ripping, stabbing sensation  He is required to lift heavy objects at work and walk long distances  He has been loosely wrapping his penis with gauze to help with the irritation and pain  He denies any fevers or chills  Please see ROS below for associated urinary symptoms  Review of Systems   Constitutional: Negative for chills and fever  HENT: Negative  Respiratory: Negative  Cardiovascular: Negative  Gastrointestinal: Negative for abdominal pain, nausea and vomiting  Genitourinary: Positive for difficulty urinating, dysuria and penile pain  Negative for hematuria, scrotal swelling and testicular pain  Stream start and stop  Notes post void dribbling   Musculoskeletal: Negative  Skin: Negative  Neurological: Negative  Vitals  Vitals:    03/08/23 1606   Weight: 66 7 kg (147 lb)   Height: 5' 7 5\" (1 715 m)       Physical Exam  Vitals reviewed  Constitutional:       General: He is not in acute distress  Appearance: Normal appearance  He is not ill-appearing, toxic-appearing or diaphoretic  HENT:      Head: Normocephalic and atraumatic  Eyes:      Conjunctiva/sclera: Conjunctivae normal    Pulmonary:      Effort: Pulmonary effort is normal  No respiratory distress     Abdominal:      General: There is no " distension  Palpations: Abdomen is soft  Tenderness: There is no abdominal tenderness  There is no right CVA tenderness, left CVA tenderness, guarding or rebound  Genitourinary:     Comments: Noverythema of glans penis  No bleeding or purulent discharge  No swelling of glans penis  Musculoskeletal:         General: Normal range of motion  Cervical back: Normal range of motion  Skin:     General: Skin is warm and dry  Neurological:      General: No focal deficit present  Mental Status: He is alert and oriented to person, place, and time  Psychiatric:         Mood and Affect: Mood normal          Behavior: Behavior normal          Thought Content:  Thought content normal          Judgment: Judgment normal          Past History  Past Medical History:   Diagnosis Date   • ADHD     Diagnosed at age 6   • Allergic    • Asthma      Social History     Socioeconomic History   • Marital status: Unknown     Spouse name: None   • Number of children: None   • Years of education: None   • Highest education level: None   Occupational History   • None   Tobacco Use   • Smoking status: Former     Packs/day: 1 00     Years: 5 00     Total pack years: 5 00     Types: Cigarettes     Quit date:      Years since quittin 4   • Smokeless tobacco: Never   Vaping Use   • Vaping Use: Every day   • Substances: Nicotine   Substance and Sexual Activity   • Alcohol use: Yes     Comment: 1 x month   • Drug use: Never   • Sexual activity: None   Other Topics Concern   • None   Social History Narrative   • None     Social Determinants of Health     Financial Resource Strain: Not on file   Food Insecurity: Not on file   Transportation Needs: Not on file   Physical Activity: Not on file   Stress: Not on file   Social Connections: Not on file   Intimate Partner Violence: Not on file   Housing Stability: Not on file     Social History     Tobacco Use   Smoking Status Former   • Packs/day: 1 00   • Years: 5 00   • "Total pack years: 5 00   • Types: Cigarettes   • Quit date:    • Years since quittin 4   Smokeless Tobacco Never     Family History   Problem Relation Age of Onset   • Autoimmune disease Mother    • No Known Problems Sister        The following portions of the patient's history were reviewed and updated as appropriate: allergies, current medications, past medical history, past social history, past surgical history and problem list     Results  No results found for this or any previous visit (from the past 1 hour(s))  ]  No results found for: \"PSA\"  Lab Results   Component Value Date    CALCIUM 8 9 2023    K 3 9 2023    CO2 27 2023     2023    BUN 21 2023    CREATININE 1 03 2023     Lab Results   Component Value Date    WBC 7 56 2023    HGB 14 9 2023    HCT 44 4 2023    MCV 90 2023     2023     Mirtha Ridley PA-C    "

## 2023-10-07 ENCOUNTER — NURSE TRIAGE (OUTPATIENT)
Dept: OTHER | Facility: OTHER | Age: 38
End: 2023-10-07

## 2023-10-07 NOTE — TELEPHONE ENCOUNTER
Regarding: Left eye irritation possibly pink eye  ----- Message from Soren Serrano sent at 10/7/2023  2:32 PM EDT -----  My left eye is very itchy and waking up with gooky crust around it. My eye is red and irritated.  Symptoms began a few days ago

## 2023-10-07 NOTE — TELEPHONE ENCOUNTER
Reason for Disposition  • [1] Eye with yellow/green discharge or eyelashes stick together AND [2] NO PCP standing order to call in antibiotic eye drops    Answer Assessment - Initial Assessment Questions  1. EYE DISCHARGE: "Is the discharge in one or both eyes?" "What color is it?" "How much is there?" "When did the discharge start?"       Left eye crusty   2. REDNESS OF SCLERA: "Is the redness in one or both eyes?" "When did the redness start?"       Redness   3. EYELIDS: "Are the eyelids red or swollen?" If Yes, ask: "How much?"       no  4. VISION: "Is there any difficulty seeing clearly?"       no  5. PAIN: "Is there any pain? If Yes, ask: "How bad is it?" (Scale 1-10; or mild, moderate, severe)     - MILD (1-3): doesn't interfere with normal activities      - MODERATE (4-7): interferes with normal activities or awakens from sleep     - SEVERE (8-10): excruciating pain, unable to do any normal activities        no  6. CONTACT LENS: "Do you wear contacts?"      no  7.  OTHER SYMPTOMS: "Do you have any other symptoms?" (e.g., fever, runny nose, cough)     Exposed at work; left eye itchy, crusty drainage and reddened    Protocols used: EYE - PUS OR DISCHARGE-ADULT-

## (undated) DEVICE — SUT VICRYL 4-0 PS-2 27 IN J426H

## (undated) DEVICE — GLOVE SRG BIOGEL 7.5

## (undated) DEVICE — STRETCH BANDAGE: Brand: CURITY

## (undated) DEVICE — BETHLEHEM UNIVERSAL MINOR GEN: Brand: CARDINAL HEALTH

## (undated) DEVICE — PREMIUM DRY TRAY LF: Brand: MEDLINE INDUSTRIES, INC.

## (undated) DEVICE — NEEDLE 25G X 1 1/2

## (undated) DEVICE — STRL COTTON TIP APPLCTR 6IN PK: Brand: CARDINAL HEALTH

## (undated) DEVICE — PLUMEPEN PRO 10FT

## (undated) DEVICE — COBAN 1 IN UNSTERILE

## (undated) DEVICE — INTENDED FOR TISSUE SEPARATION, AND OTHER PROCEDURES THAT REQUIRE A SHARP SURGICAL BLADE TO PUNCTURE OR CUT.: Brand: BARD-PARKER SAFETY BLADES SIZE 15, STERILE

## (undated) DEVICE — OCCLUSIVE GAUZE STRIP,3% BISMUTH TRIBROMOPHENATE IN PETROLATUM BLEND: Brand: XEROFORM

## (undated) DEVICE — CAUTERY TIP POLISHER: Brand: DEVON

## (undated) DEVICE — SCD SEQUENTIAL COMPRESSION COMFORT SLEEVE MEDIUM KNEE LENGTH: Brand: KENDALL SCD

## (undated) DEVICE — EXIDINE 4 PCT

## (undated) DEVICE — VIAL DECANTER